# Patient Record
Sex: FEMALE | Race: WHITE | NOT HISPANIC OR LATINO | URBAN - METROPOLITAN AREA
[De-identification: names, ages, dates, MRNs, and addresses within clinical notes are randomized per-mention and may not be internally consistent; named-entity substitution may affect disease eponyms.]

---

## 2017-11-01 ENCOUNTER — EMERGENCY (EMERGENCY)
Facility: HOSPITAL | Age: 27
LOS: 1 days | Discharge: HOME | End: 2017-11-01
Attending: EMERGENCY MEDICINE | Admitting: EMERGENCY MEDICINE
Payer: COMMERCIAL

## 2017-11-01 VITALS
DIASTOLIC BLOOD PRESSURE: 87 MMHG | HEIGHT: 67 IN | OXYGEN SATURATION: 98 % | RESPIRATION RATE: 17 BRPM | SYSTOLIC BLOOD PRESSURE: 131 MMHG | TEMPERATURE: 98 F | WEIGHT: 119.93 LBS | HEART RATE: 105 BPM

## 2017-11-01 VITALS
SYSTOLIC BLOOD PRESSURE: 121 MMHG | DIASTOLIC BLOOD PRESSURE: 74 MMHG | OXYGEN SATURATION: 99 % | RESPIRATION RATE: 16 BRPM | HEART RATE: 78 BPM

## 2017-11-01 LAB
ALBUMIN SERPL ELPH-MCNC: 4.4 G/DL — SIGNIFICANT CHANGE UP (ref 3.3–5)
ALP SERPL-CCNC: 41 U/L — SIGNIFICANT CHANGE UP (ref 40–120)
ALT FLD-CCNC: 12 U/L — SIGNIFICANT CHANGE UP (ref 10–45)
ANION GAP SERPL CALC-SCNC: 14 MMOL/L — SIGNIFICANT CHANGE UP (ref 5–17)
APPEARANCE UR: CLEAR — SIGNIFICANT CHANGE UP
APTT BLD: 28.3 SEC — SIGNIFICANT CHANGE UP (ref 27.5–37.4)
AST SERPL-CCNC: 12 U/L — SIGNIFICANT CHANGE UP (ref 10–40)
BACTERIA # UR AUTO: PRESENT /HPF
BASOPHILS NFR BLD AUTO: 0.5 % — SIGNIFICANT CHANGE UP (ref 0–2)
BILIRUB SERPL-MCNC: 0.3 MG/DL — SIGNIFICANT CHANGE UP (ref 0.2–1.2)
BILIRUB UR-MCNC: NEGATIVE — SIGNIFICANT CHANGE UP
BUN SERPL-MCNC: 7 MG/DL — SIGNIFICANT CHANGE UP (ref 7–23)
CALCIUM SERPL-MCNC: 9.5 MG/DL — SIGNIFICANT CHANGE UP (ref 8.4–10.5)
CHLORIDE SERPL-SCNC: 101 MMOL/L — SIGNIFICANT CHANGE UP (ref 96–108)
CO2 SERPL-SCNC: 26 MMOL/L — SIGNIFICANT CHANGE UP (ref 22–31)
COLOR SPEC: YELLOW — SIGNIFICANT CHANGE UP
CREAT SERPL-MCNC: 0.63 MG/DL — SIGNIFICANT CHANGE UP (ref 0.5–1.3)
DIFF PNL FLD: NEGATIVE — SIGNIFICANT CHANGE UP
EOSINOPHIL NFR BLD AUTO: 0.8 % — SIGNIFICANT CHANGE UP (ref 0–6)
EPI CELLS # UR: SIGNIFICANT CHANGE UP /HPF (ref 0–5)
GLUCOSE SERPL-MCNC: 88 MG/DL — SIGNIFICANT CHANGE UP (ref 70–99)
GLUCOSE UR QL: NEGATIVE — SIGNIFICANT CHANGE UP
HCT VFR BLD CALC: 36.7 % — SIGNIFICANT CHANGE UP (ref 34.5–45)
HGB BLD-MCNC: 11.9 G/DL — SIGNIFICANT CHANGE UP (ref 11.5–15.5)
INR BLD: 0.96 — SIGNIFICANT CHANGE UP (ref 0.88–1.16)
KETONES UR-MCNC: NEGATIVE — SIGNIFICANT CHANGE UP
LACTATE SERPL-SCNC: 1.1 MMOL/L — SIGNIFICANT CHANGE UP (ref 0.5–2)
LEUKOCYTE ESTERASE UR-ACNC: (no result)
LYMPHOCYTES # BLD AUTO: 18.1 % — SIGNIFICANT CHANGE UP (ref 13–44)
MCHC RBC-ENTMCNC: 29.3 PG — SIGNIFICANT CHANGE UP (ref 27–34)
MCHC RBC-ENTMCNC: 32.4 G/DL — SIGNIFICANT CHANGE UP (ref 32–36)
MCV RBC AUTO: 90.4 FL — SIGNIFICANT CHANGE UP (ref 80–100)
MONOCYTES NFR BLD AUTO: 7.4 % — SIGNIFICANT CHANGE UP (ref 2–14)
NEUTROPHILS NFR BLD AUTO: 73.2 % — SIGNIFICANT CHANGE UP (ref 43–77)
NITRITE UR-MCNC: NEGATIVE — SIGNIFICANT CHANGE UP
PH UR: 8.5 — HIGH (ref 5–8)
PLATELET # BLD AUTO: 222 K/UL — SIGNIFICANT CHANGE UP (ref 150–400)
POTASSIUM SERPL-MCNC: 3.4 MMOL/L — LOW (ref 3.5–5.3)
POTASSIUM SERPL-SCNC: 3.4 MMOL/L — LOW (ref 3.5–5.3)
PROT SERPL-MCNC: 7.7 G/DL — SIGNIFICANT CHANGE UP (ref 6–8.3)
PROT UR-MCNC: NEGATIVE MG/DL — SIGNIFICANT CHANGE UP
PROTHROM AB SERPL-ACNC: 10.6 SEC — SIGNIFICANT CHANGE UP (ref 9.8–12.7)
RBC # BLD: 4.06 M/UL — SIGNIFICANT CHANGE UP (ref 3.8–5.2)
RBC # FLD: 14 % — SIGNIFICANT CHANGE UP (ref 10.3–16.9)
RBC CASTS # UR COMP ASSIST: < 5 /HPF — SIGNIFICANT CHANGE UP
SODIUM SERPL-SCNC: 141 MMOL/L — SIGNIFICANT CHANGE UP (ref 135–145)
SP GR SPEC: 1.01 — SIGNIFICANT CHANGE UP (ref 1–1.03)
UROBILINOGEN FLD QL: 0.2 E.U./DL — SIGNIFICANT CHANGE UP
WBC # BLD: 7.4 K/UL — SIGNIFICANT CHANGE UP (ref 3.8–10.5)
WBC # FLD AUTO: 7.4 K/UL — SIGNIFICANT CHANGE UP (ref 3.8–10.5)
WBC UR QL: < 5 /HPF — SIGNIFICANT CHANGE UP

## 2017-11-01 PROCEDURE — 85610 PROTHROMBIN TIME: CPT

## 2017-11-01 PROCEDURE — 85025 COMPLETE CBC W/AUTO DIFF WBC: CPT

## 2017-11-01 PROCEDURE — 85730 THROMBOPLASTIN TIME PARTIAL: CPT

## 2017-11-01 PROCEDURE — 83605 ASSAY OF LACTIC ACID: CPT

## 2017-11-01 PROCEDURE — 87086 URINE CULTURE/COLONY COUNT: CPT

## 2017-11-01 PROCEDURE — 87040 BLOOD CULTURE FOR BACTERIA: CPT

## 2017-11-01 PROCEDURE — 36415 COLL VENOUS BLD VENIPUNCTURE: CPT

## 2017-11-01 PROCEDURE — 99284 EMERGENCY DEPT VISIT MOD MDM: CPT

## 2017-11-01 PROCEDURE — 81001 URINALYSIS AUTO W/SCOPE: CPT

## 2017-11-01 PROCEDURE — 80053 COMPREHEN METABOLIC PANEL: CPT

## 2017-11-01 PROCEDURE — 71010: CPT | Mod: 26

## 2017-11-01 PROCEDURE — 71045 X-RAY EXAM CHEST 1 VIEW: CPT

## 2017-11-01 PROCEDURE — 99284 EMERGENCY DEPT VISIT MOD MDM: CPT | Mod: 25

## 2017-11-01 NOTE — ED PROVIDER NOTE - PHYSICAL EXAMINATION
VITAL SIGNS: I have reviewed nursing notes and confirm.  CONSTITUTIONAL: Well-developed; well-nourished; in no acute distress.  SKIN: Agree with RN documentation regarding decubitus evaluation. Remainder of skin exam is warm and dry, no acute rash.  HEAD: Normocephalic; atraumatic.  EYES: PERRL, EOM intact; conjunctiva and sclera clear.  ENT: No nasal discharge; airway clear.  NECK: Supple; non tender.  CARD: S1, S2 normal; no murmurs, gallops, or rubs. Regular rate and rhythm.  RESP: No wheezes, rales or rhonchi.  ABD: Normal bowel sounds; soft; non-distended; non-tender; negative elmore's sign, no mcburney's pt ttp, no rigidity/guarding  EXT: Normal ROM. No clubbing, cyanosis or edema.  LYMPH: No acute cervical adenopathy.  NEURO: Alert, oriented. Grossly unremarkable.  PSYCH: Cooperative, appropriate.

## 2017-11-01 NOTE — ED PROVIDER NOTE - MEDICAL DECISION MAKING DETAILS
soft ntnd abdomen in ED w/out fever/leukocytosis. Evaluated by surgery who recommends outpt f/u with Dr. Rebecca Obrien this week. No indication for acute intervention. Tolerating PO in ED. Safe to d/c home with explicit return precautions and anticipatory guidance.

## 2017-11-01 NOTE — ED ADULT NURSE NOTE - OBJECTIVE STATEMENT
Patient comes in ambulatory into bed #OBGYN room complaining constipation that started 2 weeks ago, decrease PO intake, intermittent sharp 2/10 pain near umbilical region, LUQ and suprapubic region that started a couple of months ago. Associated symptoms with nausea and intermittently feeling hot at night. Prior tx stool softeners which have been effective last BM today. Patient sent in by PCD because patient had a CT which showed non obstructive jejunojejunal intussusception, sent in for further evaluation.

## 2017-11-01 NOTE — ED ADULT NURSE NOTE - CHPI ED SYMPTOMS NEG
no fever/no vomiting/no chills/no burning urination/no dysuria/no hematuria/CP, SOB, dizziness./no blood in stool

## 2017-11-01 NOTE — ED CLERICAL - NS ED CLERK NOTE PRE-ARRIVAL INFORMATION; ADDITIONAL PRE-ARRIVAL INFORMATION
26/F/ LUIS IRVIN SENT IN BY DR. JAVIER [phone]//[phone] , SEVERE ABD PAIN + JEGURAL INTERCEPTION W/ CT CALL SURGERY (LUANNE AJ) ( LIZZIE MANCINI)

## 2017-11-01 NOTE — CONSULT NOTE ADULT - ASSESSMENT
27 yo F with no significant PMH with upper abdominal pain, intussusception on outpt CT  -pt currently asymptomatic  -no clinical signs of obstruction  -labs wnl  -please have pt follow up with Dr Rebecca Obrien in the office - [phone]  -seen and discussed with chief resident on call

## 2017-11-01 NOTE — CONSULT NOTE ADULT - SUBJECTIVE AND OBJECTIVE BOX
HPI: 27 yo F with no significant PMH presented to ED after having outpt CT scan which demonstrated jejuno-jejunal intussusception without obstruction. Pt reports having sharp upper abdominal pain for the past month which has come and gone and at times has been severe. Reports pain increased 3 days ago and she went to her PCP who sent her for CT. Denies f/c, n/v. Reports constipation over last month but had BM yesterday and passing flatus regularly. No bloody stool. No family hx of bowel disease      PAST MEDICAL & SURGICAL HISTORY:  No pertinent past medical history  No significant past surgical history      REVIEW OF SYSTEMS - negative except as per HPI      MEDICATIONS  (STANDING):    MEDICATIONS  (PRN):      Allergies    latex (Rash; Urticaria)  No Known Drug Allergies    Intolerances        SOCIAL HISTORY:    FAMILY HISTORY:      Vital Signs Last 24 Hrs  T(C): 37.1 (2017 19:01), Max: 37.1 (2017 19:01)  T(F): 98.8 (2017 19:), Max: 98.8 (2017 19:)  HR: 89 (2017 19:) (89 - 105)  BP: 133/81 (2017 19:) (131/87 - 133/81)  BP(mean): --  RR: 16 (2017 19:) (16 - 17)  SpO2: 99% (2017 19:) (98% - 99%)    PHYSICAL EXAM:      Constitutional: AOx3, NAD    Respiratory: CTABL, no resp distress    Cardiovascular: RRR, no M/R/G    Gastrointestinal: soft, minimal tenderness to palpation in epigastrum, no rebound or guarding    Extremities: P        LABS:                        11.9   7.4   )-----------( 222      ( 2017 19:05 )             36.7         141  |  101  |  7   ----------------------------<  88  3.4<L>   |  26  |  0.63    Ca    9.5      2017 19:05    TPro  7.7  /  Alb  4.4  /  TBili  0.3  /  DBili  x   /  AST  12  /  ALT  12  /  AlkPhos  41      PT/INR - ( 2017 19:05 )   PT: 10.6 sec;   INR: 0.96          PTT - ( 2017 19:05 )  PTT:28.3 sec  Urinalysis Basic - ( 2017 19:05 )    Color: Yellow / Appearance: Clear / S.010 / pH: x  Gluc: x / Ketone: NEGATIVE  / Bili: Negative / Urobili: 0.2 E.U./dL   Blood: x / Protein: NEGATIVE mg/dL / Nitrite: NEGATIVE   Leuk Esterase: Trace / RBC: < 5 /HPF / WBC < 5 /HPF   Sq Epi: x / Non Sq Epi: 0-5 /HPF / Bacteria: Present /HPF        RADIOLOGY & ADDITIONAL STUDIES:

## 2017-11-01 NOTE — ED PROVIDER NOTE - OBJECTIVE STATEMENT
25 y/o F w/hx gluten intolerance, no formal celiac diagnosis, p/w crampy generalized non-radiating abd pain with eating over past several weeks, worse in the past week w/associated constipation and hard stools (no melena or BRBPR). No associated fever/chills. No sick contacts. No  complaints, doesn't believe she's pregnant. Received outpt CT abd/pel today which was read as concerning for jejunal intussusception. Pt is asymptomatic in ED, no active abd pain. Referred to ED by PMD for surgical consultation.

## 2017-11-01 NOTE — ED ADULT TRIAGE NOTE - NS ED NOTE AC HIGH RISK COUNTRIES
SUBJECTIVE


Subjective


feels much better, slept better





OBJECTIVE


Vital Signs





Vital Signs








  Date Time  Temp Pulse Resp B/P Pulse Ox O2 Delivery O2 Flow Rate FiO2


 


2/9/17 12:30      Room Air  


 


2/9/17 10:58 96.7 67 18 103/64 95 Nasal Cannula 4.0 





 96.7       


 


2/9/17 08:58     97 Room Air  


 


2/9/17 08:53  71  155/72    


 


2/9/17 08:53  71  155/72    


 


2/9/17 08:53  71  155/72    


 


2/9/17 08:52  71  155/72    


 


2/9/17 07:44      Nasal Cannula 3.0 


 


2/9/17 07:00 96.9 71 20 155/72 95 Nasal Cannula 4.0 





 96.9       


 


2/8/17 23:00 96.3 82 18 145/70 97 Room Air  





 96.3       


 


2/8/17 20:44  88  170/76    


 


2/8/17 20:00      Nasal Cannula 3.0 


 


2/8/17 19:36     97 Nasal Cannula 4.0 


 


2/8/17 19:00 97.7 88 18 170/76 97 Room Air  





 97.7       


 


2/8/17 15:37 97.7 81 19 144/81 96 Room Air  





 97.7       


 


2/8/17 14:53      Nasal Cannula 3.0 








I & O











 Intake and Output 


 


 2/9/17





 07:00


 


Intake Total 480 ml


 


Output Total 2425 ml


 


Balance -1945 ml


 


 


 


Intake Oral 480 ml


 


Output Urine Total 2425 ml











PHYSICAL EXAM


Physical Exam


lungs with better air movement


heart RRR


abd soft





ASSESSMENT/PLAN


Assessment/Plan


home today, f/u out pt with Dr. Hugo


Problems:  





COMMENT


Lab





Laboratory Tests








Test


  2/8/17


20:34 2/9/17


04:50 2/9/17


07:23 2/9/17


11:54


 


Glucose (Fingerstick)


  207mg/dL


(70-99) 


  144mg/dL


(70-99) 130mg/dL


(70-99)


 


White Blood Count


  


  6.2x10^3/uL


(4.0-11.0) 


  


 


 


Red Blood Count


  


  5.23x10^6/uL


(4.30-5.70) 


  


 


 


Hemoglobin


  


  14.5g/dL


(13.0-17.5) 


  


 


 


Hematocrit


  


  42.8%


(39.0-53.0) 


  


 


 


Mean Corpuscular Volume  82fL ()   


 


Mean Corpuscular Hemoglobin  28pg (25-35)   


 


Mean Corpuscular Hemoglobin


Concent 


  34g/dL (31-37) 


  


  


 


 


Red Cell Distribution Width


  


  18.0%


(11.5-14.5) 


  


 


 


Platelet Count


  


  254x10^3/uL


(140-400) 


  


 


 


Sodium Level


  


  142mmol/L


(136-145) 


  


 


 


Potassium Level


  


  4.1mmol/L


(3.5-5.1) 


  


 


 


Chloride Level


  


  104mmol/L


() 


  


 


 


Carbon Dioxide Level


  


  28mmol/L


(21-32) 


  


 


 


Anion Gap  10 (6-14)   


 


Blood Urea Nitrogen  26mg/dL (8-26)   


 


Creatinine


  


  1.1mg/dL


(0.7-1.3) 


  


 


 


Estimated GFR


(Cockcroft-Gault) 


  66.6 


  


  


 


 


Glucose Level


  


  162mg/dL


(70-99) 


  


 


 


Calcium Level


  


  9.4mg/dL


(8.5-10.1) 


  


 














SIMONA SAMANIEGO MD Feb 9, 2017 13:02 No

## 2017-11-02 LAB
CULTURE RESULTS: NO GROWTH — SIGNIFICANT CHANGE UP
SPECIMEN SOURCE: SIGNIFICANT CHANGE UP

## 2017-11-06 LAB
CULTURE RESULTS: SIGNIFICANT CHANGE UP
CULTURE RESULTS: SIGNIFICANT CHANGE UP
SPECIMEN SOURCE: SIGNIFICANT CHANGE UP
SPECIMEN SOURCE: SIGNIFICANT CHANGE UP

## 2017-11-09 DIAGNOSIS — R10.84 GENERALIZED ABDOMINAL PAIN: ICD-10-CM

## 2017-11-09 DIAGNOSIS — K56.1 INTUSSUSCEPTION: ICD-10-CM

## 2017-11-09 DIAGNOSIS — Z91.040 LATEX ALLERGY STATUS: ICD-10-CM

## 2017-11-10 ENCOUNTER — OUTPATIENT (OUTPATIENT)
Dept: OUTPATIENT SERVICES | Facility: HOSPITAL | Age: 27
LOS: 1 days | Discharge: HOME | End: 2017-11-10
Payer: COMMERCIAL

## 2017-11-10 ENCOUNTER — RESULT REVIEW (OUTPATIENT)
Age: 27
End: 2017-11-10

## 2017-11-10 PROCEDURE — 43239 EGD BIOPSY SINGLE/MULTIPLE: CPT

## 2017-11-10 PROCEDURE — 88305 TISSUE EXAM BY PATHOLOGIST: CPT

## 2017-11-10 PROCEDURE — 88342 IMHCHEM/IMCYTCHM 1ST ANTB: CPT

## 2017-11-10 PROCEDURE — 45380 COLONOSCOPY AND BIOPSY: CPT

## 2017-11-14 LAB — SURGICAL PATHOLOGY STUDY: SIGNIFICANT CHANGE UP

## 2017-12-29 ENCOUNTER — OUTPATIENT (OUTPATIENT)
Dept: OUTPATIENT SERVICES | Facility: HOSPITAL | Age: 27
LOS: 1 days | End: 2017-12-29
Payer: COMMERCIAL

## 2017-12-29 DIAGNOSIS — K56.1 INTUSSUSCEPTION: ICD-10-CM

## 2017-12-29 PROCEDURE — C1889: CPT

## 2018-02-08 PROBLEM — Z00.00 ENCOUNTER FOR PREVENTIVE HEALTH EXAMINATION: Status: ACTIVE | Noted: 2018-02-08

## 2018-02-26 ENCOUNTER — APPOINTMENT (OUTPATIENT)
Dept: NEUROLOGY | Facility: CLINIC | Age: 28
End: 2018-02-26
Payer: COMMERCIAL

## 2018-02-26 ENCOUNTER — TRANSCRIPTION ENCOUNTER (OUTPATIENT)
Age: 28
End: 2018-02-26

## 2018-02-26 VITALS
HEIGHT: 66 IN | HEART RATE: 98 BPM | WEIGHT: 123 LBS | DIASTOLIC BLOOD PRESSURE: 82 MMHG | SYSTOLIC BLOOD PRESSURE: 127 MMHG | BODY MASS INDEX: 19.77 KG/M2 | OXYGEN SATURATION: 99 %

## 2018-02-26 DIAGNOSIS — R20.2 PARESTHESIA OF SKIN: ICD-10-CM

## 2018-02-26 PROCEDURE — 99205 OFFICE O/P NEW HI 60 MIN: CPT

## 2018-02-28 LAB
ACE BLD-CCNC: 26 U/L
ALBUMIN MFR SERPL ELPH: 58.9 %
ALBUMIN SERPL-MCNC: 4.4 G/DL
ALBUMIN/GLOB SERPL: 1.5 RATIO
ALPHA1 GLOB MFR SERPL ELPH: 5.3 %
ALPHA1 GLOB SERPL ELPH-MCNC: 0.4 G/DL
ALPHA2 GLOB MFR SERPL ELPH: 9.2 %
ALPHA2 GLOB SERPL ELPH-MCNC: 0.7 G/DL
B-GLOBULIN MFR SERPL ELPH: 11.4 %
B-GLOBULIN SERPL ELPH-MCNC: 0.8 G/DL
GAMMA GLOB FLD ELPH-MCNC: 1.1 G/DL
GAMMA GLOB MFR SERPL ELPH: 15.2 %
INTERPRETATION SERPL IEP-IMP: NORMAL
PROT SERPL-MCNC: 7.4 G/DL
PROT SERPL-MCNC: 7.4 G/DL
RPR SER-TITR: NORMAL

## 2018-03-01 DIAGNOSIS — R41.3 OTHER AMNESIA: ICD-10-CM

## 2018-03-01 LAB — COPPER SERPL-MCNC: 151 UG/DL

## 2018-03-02 LAB
VIT B2 SERPL-MCNC: 150 UG/L
VIT B6 SERPL-MCNC: 6.2 UG/L

## 2018-03-05 LAB — VIT B1 SERPL-MCNC: 92.6 NMOL/L

## 2018-06-05 ENCOUNTER — APPOINTMENT (OUTPATIENT)
Dept: NEUROLOGY | Facility: CLINIC | Age: 28
End: 2018-06-05

## 2018-10-10 ENCOUNTER — OTHER (OUTPATIENT)
Age: 28
End: 2018-10-10

## 2023-05-25 NOTE — ED PROVIDER NOTE - DISCUSSED CLINICAL AND RADIOLOGICAL FINDINGS WITH, MDM
patient Complex Repair And O-T Advancement Flap Text: The defect edges were debeveled with a #15 scalpel blade.  The primary defect was closed partially with a complex linear closure.  Given the location of the remaining defect, shape of the defect and the proximity to free margins an O-T advancement flap was deemed most appropriate for complete closure of the defect.  Using a sterile surgical marker, an appropriate advancement flap was drawn incorporating the defect and placing the expected incisions within the relaxed skin tension lines where possible. The area thus outlined was incised deep to adipose tissue with a #15 scalpel blade. The skin margins were undermined to an appropriate distance in all directions utilizing iris scissors and carried over to close the primary defect.